# Patient Record
Sex: FEMALE | URBAN - METROPOLITAN AREA
[De-identification: names, ages, dates, MRNs, and addresses within clinical notes are randomized per-mention and may not be internally consistent; named-entity substitution may affect disease eponyms.]

---

## 2020-02-19 ENCOUNTER — OFFICE VISIT (OUTPATIENT)
Dept: URBAN - METROPOLITAN AREA CLINIC 22 | Facility: CLINIC | Age: 45
End: 2020-02-19
Payer: COMMERCIAL

## 2020-02-19 PROCEDURE — 92002 INTRM OPH EXAM NEW PATIENT: CPT | Performed by: OPTOMETRIST

## 2020-02-19 ASSESSMENT — INTRAOCULAR PRESSURE
OS: 25
OD: 21

## 2020-08-11 ENCOUNTER — PATIENT COMMUNICATION (OUTPATIENT)
Dept: URBAN - METROPOLITAN AREA CLINIC 23 | Facility: CLINIC | Age: 45
End: 2020-08-11
Payer: COMMERCIAL

## 2020-08-11 DIAGNOSIS — H40.033 ANATOMICAL NARROW ANGLE, BILATERAL: Primary | ICD-10-CM

## 2020-08-11 PROCEDURE — 92133 CPTRZD OPH DX IMG PST SGM ON: CPT | Performed by: OPHTHALMOLOGY

## 2020-08-11 PROCEDURE — 92020 GONIOSCOPY: CPT | Performed by: OPHTHALMOLOGY

## 2020-08-11 PROCEDURE — 92004 COMPRE OPH EXAM NEW PT 1/>: CPT | Performed by: OPHTHALMOLOGY

## 2020-08-11 PROCEDURE — 76514 ECHO EXAM OF EYE THICKNESS: CPT | Performed by: OPHTHALMOLOGY

## 2020-08-11 RX ORDER — PREDNISOLONE ACETATE 10 MG/ML
1 % SUSPENSION/ DROPS OPHTHALMIC
Qty: 1 | Refills: 1 | Status: ACTIVE
Start: 2020-08-11

## 2020-08-11 ASSESSMENT — INTRAOCULAR PRESSURE
OD: 16
OS: 16

## 2020-08-11 NOTE — IMPRESSION/PLAN
Impression: Anatomical narrow angle, bilateral: H40.033. Plan: Pt has NAG  Risk  Gonio : becky ou    Pachs:  [phone]       Today's IOP  :  16 ou  Tmax &date: 21, 25 Target IOP low to mid teens Pt denies Family hx of Glaucoma Right eye is the better seeing eye No VF today C/D: .4-.4 / .3-.3 OCT: (08/11/2020) 89, 80 Pt denies Sulfa Allergy // Pt denies Lung /Heart dx Pt is currently using : no drops No previous medications used Plan :
1. Recommend LPI OU ; Discussed Risks and benefits of LPI. Patient is aware that there is a 20% chance that they may require enlargement of the LPI to create an ideal sized iridotomy. Patient may experience an inferior floater following the LPI procedure. The patient is aware that if angle closure occurs, they may experience significant vision loss and possibly complete loss of all sight in the affected eye. Will rx Pred Q2h Day of procedure then taper, QID for 7 days then discontinue. 2. Pt agrees with plan and wishes to move forward 3. Pt was given written literature on NAG 4. Schedule LPI OD then OS Risk Level 1